# Patient Record
Sex: FEMALE | Race: WHITE | HISPANIC OR LATINO | Employment: FULL TIME | URBAN - METROPOLITAN AREA
[De-identification: names, ages, dates, MRNs, and addresses within clinical notes are randomized per-mention and may not be internally consistent; named-entity substitution may affect disease eponyms.]

---

## 2021-11-09 ENCOUNTER — HOSPITAL ENCOUNTER (EMERGENCY)
Facility: HOSPITAL | Age: 61
Discharge: HOME | End: 2021-11-09
Attending: EMERGENCY MEDICINE | Admitting: EMERGENCY MEDICINE

## 2021-11-09 ENCOUNTER — APPOINTMENT (OUTPATIENT)
Dept: GENERAL RADIOLOGY | Facility: HOSPITAL | Age: 61
End: 2021-11-09

## 2021-11-09 VITALS
SYSTOLIC BLOOD PRESSURE: 142 MMHG | HEART RATE: 72 BPM | HEIGHT: 63 IN | TEMPERATURE: 97.8 F | OXYGEN SATURATION: 94 % | RESPIRATION RATE: 18 BRPM | WEIGHT: 121 LBS | BODY MASS INDEX: 21.44 KG/M2 | DIASTOLIC BLOOD PRESSURE: 76 MMHG

## 2021-11-09 DIAGNOSIS — S39.012A ACUTE MYOFASCIAL STRAIN OF LUMBAR REGION, INITIAL ENCOUNTER: Primary | ICD-10-CM

## 2021-11-09 PROCEDURE — 25010000002 KETOROLAC TROMETHAMINE PER 15 MG: Performed by: EMERGENCY MEDICINE

## 2021-11-09 PROCEDURE — 63710000001 ONDANSETRON PER 8 MG: Performed by: EMERGENCY MEDICINE

## 2021-11-09 PROCEDURE — 25010000002 TRIAMCINOLONE PER 10 MG: Performed by: EMERGENCY MEDICINE

## 2021-11-09 PROCEDURE — 96372 THER/PROPH/DIAG INJ SC/IM: CPT

## 2021-11-09 PROCEDURE — 72100 X-RAY EXAM L-S SPINE 2/3 VWS: CPT

## 2021-11-09 PROCEDURE — 99283 EMERGENCY DEPT VISIT LOW MDM: CPT

## 2021-11-09 RX ORDER — KETOROLAC TROMETHAMINE 30 MG/ML
60 INJECTION, SOLUTION INTRAMUSCULAR; INTRAVENOUS ONCE
Status: COMPLETED | OUTPATIENT
Start: 2021-11-09 | End: 2021-11-09

## 2021-11-09 RX ORDER — HYDROCODONE BITARTRATE AND ACETAMINOPHEN 7.5; 325 MG/1; MG/1
1 TABLET ORAL ONCE
Status: COMPLETED | OUTPATIENT
Start: 2021-11-09 | End: 2021-11-09

## 2021-11-09 RX ORDER — ONDANSETRON 4 MG/1
4 TABLET, FILM COATED ORAL ONCE
Status: COMPLETED | OUTPATIENT
Start: 2021-11-09 | End: 2021-11-09

## 2021-11-09 RX ORDER — HYDROCODONE BITARTRATE AND ACETAMINOPHEN 5; 325 MG/1; MG/1
1 TABLET ORAL EVERY 6 HOURS PRN
Qty: 12 TABLET | Refills: 0 | Status: SHIPPED | OUTPATIENT
Start: 2021-11-09

## 2021-11-09 RX ORDER — METHOCARBAMOL 750 MG/1
750 TABLET, FILM COATED ORAL 4 TIMES DAILY
Status: DISCONTINUED | OUTPATIENT
Start: 2021-11-09 | End: 2021-11-09 | Stop reason: HOSPADM

## 2021-11-09 RX ORDER — TRIAMCINOLONE ACETONIDE 40 MG/ML
80 INJECTION, SUSPENSION INTRA-ARTICULAR; INTRAMUSCULAR ONCE
Status: COMPLETED | OUTPATIENT
Start: 2021-11-09 | End: 2021-11-09

## 2021-11-09 RX ORDER — METHOCARBAMOL 750 MG/1
750 TABLET, FILM COATED ORAL 3 TIMES DAILY PRN
Qty: 20 TABLET | Refills: 0 | Status: SHIPPED | OUTPATIENT
Start: 2021-11-09

## 2021-11-09 RX ORDER — METHYLPREDNISOLONE 4 MG/1
TABLET ORAL
Qty: 21 TABLET | Refills: 0 | Status: SHIPPED | OUTPATIENT
Start: 2021-11-09

## 2021-11-09 RX ADMIN — TRIAMCINOLONE ACETONIDE 80 MG: 40 INJECTION, SUSPENSION INTRA-ARTICULAR; INTRAMUSCULAR at 16:34

## 2021-11-09 RX ADMIN — METHOCARBAMOL 750 MG: 750 TABLET ORAL at 16:34

## 2021-11-09 RX ADMIN — HYDROCODONE BITARTRATE AND ACETAMINOPHEN 1 TABLET: 7.5; 325 TABLET ORAL at 16:35

## 2021-11-09 RX ADMIN — ONDANSETRON HYDROCHLORIDE 4 MG: 4 TABLET, FILM COATED ORAL at 16:34

## 2021-11-09 RX ADMIN — KETOROLAC TROMETHAMINE 60 MG: 30 INJECTION, SOLUTION INTRAMUSCULAR at 16:34

## 2021-11-09 NOTE — ED PROVIDER NOTES
EMERGENCY DEPARTMENT ENCOUNTER    Pt Name: Tish Mirza  MRN: [id number]  Pt :   1960  Room Number:  26SF/26  Date of encounter:  2021  PCP: Dilcia Edward DO  ED Provider: VERONIKA Ku    Historian: patient      HPI:  Chief Complaint: Back pain.        Context: Tish Miraz is a 61 y.o. female who presents to the ED c/o acute back pain to the right side of her lumbar region onset suddenly yesterday while she was performing yoga posing.  Patient states that she was doing a head stand when she fell on her back.  She has had some radiation of pain down toward her right buttock.  She said no sensory changes.  No neurosensory complaint or focal weakness.  No saddle anesthesia or incontinence.  No urinary retention.    ROS is negative for fever chills or recent illness.  Negative for chest pain or cough or shortness of breath.  Negative for nausea, vomiting, diarrhea, abdominal pain.   systems are negative.  No neurosensory complaint or focal weakness.    Patient denies any history of back disease or back surgery.    PAST MEDICAL HISTORY  No past medical history on file.      PAST SURGICAL HISTORY  No past surgical history on file.      FAMILY HISTORY  No family history on file.      SOCIAL HISTORY  Social History     Socioeconomic History   • Marital status:          ALLERGIES  Patient has no known allergies.        REVIEW OF SYSTEMS  Review of Systems   All systems reviewed and negative except for those discussed in HPI.       PHYSICAL EXAM    I have reviewed the triage vital signs and nursing notes.    ED Triage Vitals [21 1420]   Temp Heart Rate Resp BP SpO2   97.8 °F (36.6 °C) 72 18 142/76 94 %      Temp src Heart Rate Source Patient Position BP Location FiO2 (%)   Oral Monitor Sitting Left arm --       Physical Exam  GENERAL:   Appears well.  She is an excellent historian.  Her vital signs are normal  HENT: Nares patent.  EYES: No scleral icterus.  CV: Regular rhythm,  regular rate.  RESPIRATORY: Normal effort.  No audible wheezes, rales or rhonchi.  Chest wall is nontender.  ABDOMEN: Soft, nontender  MUSCULOSKELETAL: No deformities.  Pelvis is stable.  Straight leg raise is negative bilaterally.  Moves all 4 extremities well.  Patient localizes her presenting discomfort to palpation on the right lumbar paravertebral musculature.  NEURO: Alert, moves all extremities, follows commands.  No neurosensory deficits or focal weakness.  SKIN: Warm, dry, no rash visualized.        LAB RESULTS  No results found for this or any previous visit (from the past 24 hour(s)).    If labs were ordered, I independently reviewed the results.        RADIOLOGY  XR Spine Lumbar 2 or 3 View    Result Date: 11/9/2021  EXAMINATION: XR SPINE, LUMBAR, 2 OR 3 VW-11/09/2021:  INDICATION: FALL INJURY.  COMPARISON: NONE.  FINDINGS: Lumbar vertebral body heights and alignment appear within normal limits. Disc spaces are well maintained except at L5-S1, where there is moderately advanced degenerative disc disease. No compression deformity is seen. The included portions of the pelvic bones appear to be intact. The included sacral segments appear normally aligned and intact.      Moderate L5-S1 degenerative disc disease. No evidence of acute or healing lumbar trauma or significant focal subluxation.   D:  11/09/2021 E:  11/09/2021         PROCEDURES    Procedures    No orders to display       MEDICATIONS GIVEN IN ER    Medications   methocarbamol (ROBAXIN) tablet 750 mg (750 mg Oral Given 11/9/21 1634)   HYDROcodone-acetaminophen (NORCO) 7.5-325 MG per tablet 1 tablet (1 tablet Oral Given 11/9/21 1635)   ondansetron (ZOFRAN) tablet 4 mg (4 mg Oral Given 11/9/21 1634)   ketorolac (TORADOL) injection 60 mg (60 mg Intramuscular Given 11/9/21 1634)   triamcinolone acetonide (KENALOG-40) injection 80 mg (80 mg Intramuscular Given 11/9/21 1634)                      AS OF 17:05 EST VITALS:    BP - 142/76  HR - 72  TEMP -  97.8 °F (36.6 °C) (Oral)  O2 SATS - 94%        DIAGNOSIS  Final diagnoses:   Acute myofascial strain of lumbar region, initial encounter         DISPOSITION    DISCHARGE    Patient discharged in stable condition.    Reviewed implications of results, diagnosis, meds, responsibility to follow up, warning signs and symptoms of possible worsening, potential complications and reasons to return to ER.    Patient/Family voiced understanding of above instructions.    Discussed plan for discharge, as there is no emergent indication for admission.  Pt/family is agreeable and understands need for follow up and possible repeat testing.  Pt/family is aware that discharge does not mean that nothing is wrong but that it indicates no emergency is currently present that requires admission and they must continue care with follow-up as given below or with a physician of their choice.     FOLLOW-UP  Dilcia Edward DO  [address] [facility] [address] [phone]    Schedule an appointment as soon as possible for a visit in 2 days  If symptoms worsen         Medication List      New Prescriptions    methocarbamol 750 MG tablet  Commonly known as: ROBAXIN  Take 1 tablet by mouth 3 (Three) Times a Day As Needed for Muscle Spasms.     methylPREDNISolone 4 MG dose pack  Commonly known as: MEDROL  Take as directed on package instructions.           Where to Get Your Medications      These medications were sent to [id number] - [place], [place] - [address] - [phone]  - [phone] FX  [address], [facility], located within [facility] [address]    Phone: [phone]   · methocarbamol 750 MG tablet  · methylPREDNISolone 4 MG dose pack                Yamile Jones APRN  11/09/21 2068

## 2021-11-09 NOTE — DISCHARGE INSTRUCTIONS
Home to rest.  Maintain your very best hydration and nutrition.  Medications as directed.  Start the Medrol Dosepak on Thursday morning if your pain is equally uncomfortable.  Follow-up with your primary care provider to monitor your recovery and surveillance your recovery.  Together you can discern if physical therapy or advanced imaging is necessary.  Thank you